# Patient Record
Sex: MALE | Race: WHITE
[De-identification: names, ages, dates, MRNs, and addresses within clinical notes are randomized per-mention and may not be internally consistent; named-entity substitution may affect disease eponyms.]

---

## 2019-04-26 ENCOUNTER — HOSPITAL ENCOUNTER (EMERGENCY)
Dept: HOSPITAL 47 - EC | Age: 79
LOS: 2 days | Discharge: PSYCHIATRIC OR SUBSTANCE USE FACILITY | End: 2019-04-28
Payer: MEDICARE

## 2019-04-26 VITALS — RESPIRATION RATE: 16 BRPM

## 2019-04-26 DIAGNOSIS — F78: Primary | ICD-10-CM

## 2019-04-26 DIAGNOSIS — F17.200: ICD-10-CM

## 2019-04-26 LAB
ANION GAP SERPL CALC-SCNC: 9 MMOL/L
BASOPHILS # BLD AUTO: 0 K/UL (ref 0–0.2)
BASOPHILS NFR BLD AUTO: 1 %
BUN SERPL-SCNC: 7 MG/DL (ref 9–20)
CALCIUM SPEC-MCNC: 9.4 MG/DL (ref 8.4–10.2)
CHLORIDE SERPL-SCNC: 104 MMOL/L (ref 98–107)
CO2 SERPL-SCNC: 26 MMOL/L (ref 22–30)
EOSINOPHIL # BLD AUTO: 0.1 K/UL (ref 0–0.7)
EOSINOPHIL NFR BLD AUTO: 1 %
ERYTHROCYTE [DISTWIDTH] IN BLOOD BY AUTOMATED COUNT: 4.85 M/UL (ref 4.3–5.9)
ERYTHROCYTE [DISTWIDTH] IN BLOOD: 13.3 % (ref 11.5–15.5)
GLUCOSE SERPL-MCNC: 128 MG/DL (ref 74–99)
HCT VFR BLD AUTO: 46.7 % (ref 39–53)
HGB BLD-MCNC: 15.7 GM/DL (ref 13–17.5)
LYMPHOCYTES # SPEC AUTO: 1.2 K/UL (ref 1–4.8)
LYMPHOCYTES NFR SPEC AUTO: 14 %
MCH RBC QN AUTO: 32.3 PG (ref 25–35)
MCHC RBC AUTO-ENTMCNC: 33.6 G/DL (ref 31–37)
MCV RBC AUTO: 96.3 FL (ref 80–100)
MONOCYTES # BLD AUTO: 0.5 K/UL (ref 0–1)
MONOCYTES NFR BLD AUTO: 6 %
NEUTROPHILS # BLD AUTO: 6.8 K/UL (ref 1.3–7.7)
NEUTROPHILS NFR BLD AUTO: 77 %
PH UR: 5.5 [PH] (ref 5–8)
PLATELET # BLD AUTO: 252 K/UL (ref 150–450)
POTASSIUM SERPL-SCNC: 4.1 MMOL/L (ref 3.5–5.1)
RBC UR QL: 2 /HPF (ref 0–5)
SODIUM SERPL-SCNC: 139 MMOL/L (ref 137–145)
SP GR UR: 1 (ref 1–1.03)
SQUAMOUS UR QL AUTO: <1 /HPF (ref 0–4)
UROBILINOGEN UR QL STRIP: <2 MG/DL (ref ?–2)
WBC # BLD AUTO: 8.7 K/UL (ref 3.8–10.6)
WBC #/AREA URNS HPF: <1 /HPF (ref 0–5)

## 2019-04-26 PROCEDURE — 80306 DRUG TEST PRSMV INSTRMNT: CPT

## 2019-04-26 PROCEDURE — 85025 COMPLETE CBC W/AUTO DIFF WBC: CPT

## 2019-04-26 PROCEDURE — 80320 DRUG SCREEN QUANTALCOHOLS: CPT

## 2019-04-26 PROCEDURE — 93005 ELECTROCARDIOGRAM TRACING: CPT

## 2019-04-26 PROCEDURE — 81001 URINALYSIS AUTO W/SCOPE: CPT

## 2019-04-26 PROCEDURE — 36415 COLL VENOUS BLD VENIPUNCTURE: CPT

## 2019-04-26 PROCEDURE — 99285 EMERGENCY DEPT VISIT HI MDM: CPT

## 2019-04-26 PROCEDURE — 80048 BASIC METABOLIC PNL TOTAL CA: CPT

## 2019-04-26 PROCEDURE — 82075 ASSAY OF BREATH ETHANOL: CPT

## 2019-04-26 NOTE — ED
General Adult HPI





- General


Source: patient, EMS, RN notes reviewed


Mode of arrival: ambulatory


Limitations: no limitations





<Jak Cummings - Last Filed: 04/26/19 17:06>





<Chacorta Araujo - Last Filed: 04/26/19 19:43>





- General


Chief complaint: Psychiatric Symptoms


Stated complaint: Mental Health


Time Seen by Provider: 04/26/19 15:21





- History of Present Illness


Initial comments: 





Patient is a pleasant 78-year-old male presenting to the emergency department 

for mental health evaluation.  Patient is brought by EMS.  Patient states he is 

not clear why he is here.  Patient admits that his daughter does not feel he t

akes good care of himself because his apartment is messy.  Patient states he has

been eating and drinking okay.  Patient states he does usually go to the barn 

have a few drinks every day.  Patient has been bathing daily.  Patient denies 

any depression or suicidal or homicidal thoughts.  No hallucinations.  No 

physical complaints. (Jak Cummings)





- Related Data


                                Home Medications











 Medication  Instructions  Recorded  Confirmed


 


No Known Home Medications  04/26/19 04/26/19











                                    Allergies











Allergy/AdvReac Type Severity Reaction Status Date / Time


 


No Known Allergies Allergy   Verified 04/26/19 16:47














Review of Systems


ROS Other: All systems not noted in ROS Statement are negative.


Constitutional: Denies: fever


Eyes: Denies: eye pain


ENT: Denies: ear pain


Respiratory: Denies: cough


Cardiovascular: Denies: chest pain


Endocrine: Denies: fatigue


Gastrointestinal: Denies: abdominal pain


Genitourinary: Denies: dysuria


Musculoskeletal: Denies: back pain


Skin: Denies: rash


Neurological: Denies: weakness


Psychiatric: Denies: depression, auditory hallucinations, visual hallucinations,

homicidal thoughts, suicidal thoughts





<Jak Cummings - Last Filed: 04/26/19 17:06>


ROS Other: All systems not noted in ROS Statement are negative.





<Chacorta Araujo - Last Filed: 04/26/19 19:43>


ROS Statement: 


Those systems with pertinent positive or pertinent negative responses have been 

documented in the HPI.








Past Medical History


Past Medical History: Unable to Obtain


History of Any Multi-Drug Resistant Organisms: None Reported


Past Surgical History: Appendectomy, Tonsillectomy


Past Psychological History: No Psychological Hx Reported


Smoking Status: Current every day smoker


Past Alcohol Use History: Daily


Past Drug Use History: None Reported, Cocaine (Patient previously used cocaine, 

not recently.)





<Jak Cummings - Last Filed: 04/26/19 17:06>





General Exam


Limitations: no limitations


General appearance: alert, in no apparent distress, other (Somewhat disheveled 

appearing)


Head exam: Present: normocephalic


Eye exam: Present: normal appearance, PERRL


ENT exam: Present: normal oropharynx


Neck exam: Present: normal inspection


Respiratory exam: Present: normal lung sounds bilaterally


Cardiovascular Exam: Present: regular rate, normal rhythm


GI/Abdominal exam: Present: soft.  Absent: tenderness


Extremities exam: Present: normal inspection


Neurological exam: Present: alert


Psychiatric exam: Present: normal affect, normal mood


Skin exam: Present: normal color





<Jak Cummings - Last Filed: 04/26/19 17:06>





Course





                                   Vital Signs











  04/26/19





  15:23


 


Temperature 97.9 F


 


Pulse Rate 100


 


Respiratory 16





Rate 


 


Blood Pressure 153/119


 


O2 Sat by Pulse 100





Oximetry 














EKG Findings





- EKG Comments:


EKG Findings:: Normal sinus rhythm 96.  .  QRS 92.  .  QTc 449.  

Normal axis.  Normal QRS.  Nonspecific ST-T.





<Jak Cummings - Last Filed: 04/26/19 17:06>





Medical Decision Making





- Lab Data


Result diagrams: 


                                 04/26/19 16:07





<Jak Cummings - Last Filed: 04/26/19 17:06>





- Lab Data


Result diagrams: 


                                 04/26/19 16:07





                                 04/26/19 16:07





<Chacorta Araujo - Last Filed: 04/26/19 19:43>





- Medical Decision Making





Patient is sinus to me by previous shift resident.  Briefly, patient is 

78-year-old male presents for mental health evaluation.  His petition by family.

 Patient is disheveled and is unkempt obtained.  Patient was evaluated by EPS.  

Patient to be transferred to [facility] (Chacorta Araujo)





- Lab Data





                                   Lab Results











  04/26/19 04/26/19 04/26/19 Range/Units





  16:02 16:07 16:07 


 


WBC    8.7  (3.8-10.6)  k/uL


 


RBC    4.85  (4.30-5.90)  m/uL


 


Hgb    15.7  (13.0-17.5)  gm/dL


 


Hct    46.7  (39.0-53.0)  %


 


MCV    96.3  (80.0-100.0)  fL


 


MCH    32.3  (25.0-35.0)  pg


 


MCHC    33.6  (31.0-37.0)  g/dL


 


RDW    13.3  (11.5-15.5)  %


 


Plt Count    252  (150-450)  k/uL


 


Neutrophils %    77  %


 


Lymphocytes %    14  %


 


Monocytes %    6  %


 


Eosinophils %    1  %


 


Basophils %    1  %


 


Neutrophils #    6.8  (1.3-7.7)  k/uL


 


Lymphocytes #    1.2  (1.0-4.8)  k/uL


 


Monocytes #    0.5  (0-1.0)  k/uL


 


Eosinophils #    0.1  (0-0.7)  k/uL


 


Basophils #    0.0  (0-0.2)  k/uL


 


Sodium   139   (137-145)  mmol/L


 


Potassium   4.1   (3.5-5.1)  mmol/L


 


Chloride   104   ()  mmol/L


 


Carbon Dioxide   26   (22-30)  mmol/L


 


Anion Gap   9   mmol/L


 


BUN   7 L   (9-20)  mg/dL


 


Creatinine   0.64 L   (0.66-1.25)  mg/dL


 


Est GFR (CKD-EPI)AfAm   >90   (>60 ml/min/1.73 sqM)  


 


Est GFR (CKD-EPI)NonAf   >90   (>60 ml/min/1.73 sqM)  


 


Glucose   128 H   (74-99)  mg/dL


 


Calcium   9.4   (8.4-10.2)  mg/dL


 


Urine Color  Light Yellow    


 


Urine Appearance  Clear    (Clear)  


 


Urine pH  5.5    (5.0-8.0)  


 


Ur Specific Gravity  1.002    (1.001-1.035)  


 


Urine Protein  Negative    (Negative)  


 


Urine Glucose (UA)  Negative    (Negative)  


 


Urine Ketones  Negative    (Negative)  


 


Urine Blood  Trace H    (Negative)  


 


Urine Nitrite  Negative    (Negative)  


 


Urine Bilirubin  Negative    (Negative)  


 


Urine Urobilinogen  <2.0    (<2.0)  mg/dL


 


Ur Leukocyte Esterase  Negative    (Negative)  


 


Urine RBC  2    (0-5)  /hpf


 


Urine WBC  <1    (0-5)  /hpf


 


Ur Squamous Epith Cells  <1    (0-4)  /hpf


 


Amorphous Sediment  Rare H    (None)  /hpf


 


Urine Mucus  Rare H    (None)  /hpf


 


Urine Opiates Screen  Not Detected    (NotDetected)  


 


Ur Oxycodone Screen  Not Detected    (NotDetected)  


 


Urine Methadone Screen  Not Detected    (NotDetected)  


 


Ur Propoxyphene Screen  Not Detected    (NotDetected)  


 


Ur Barbiturates Screen  Not Detected    (NotDetected)  


 


U Tricyclic Antidepress  Not Detected    (NotDetected)  


 


Ur Phencyclidine Scrn  Not Detected    (NotDetected)  


 


Ur Amphetamines Screen  Not Detected    (NotDetected)  


 


U Methamphetamines Scrn  Not Detected    (NotDetected)  


 


U Benzodiazepines Scrn  Not Detected    (NotDetected)  


 


Urine Cocaine Screen  Not Detected    (NotDetected)  


 


U Marijuana (THC) Screen  Not Detected    (NotDetected)  


 


Serum Alcohol   <10   mg/dL














Disposition





<Jak Cummings - Last Filed: 04/26/19 17:06>


Time of Disposition: 19:43





<Chacorta Araujo - Last Filed: 04/26/19 19:43>


Clinical Impression: 


 Gravely disabled





Disposition: TRANSFER TO PSYCH HOSP/UNIT


Condition: Fair


Referrals: 


None,Stated [Primary Care Provider] - 1-2 days

## 2019-04-28 VITALS — DIASTOLIC BLOOD PRESSURE: 84 MMHG | HEART RATE: 82 BPM | SYSTOLIC BLOOD PRESSURE: 133 MMHG | TEMPERATURE: 97.8 F
